# Patient Record
Sex: FEMALE | URBAN - METROPOLITAN AREA
[De-identification: names, ages, dates, MRNs, and addresses within clinical notes are randomized per-mention and may not be internally consistent; named-entity substitution may affect disease eponyms.]

---

## 2024-10-20 ENCOUNTER — INPATIENT (INPATIENT)
Facility: HOSPITAL | Age: 74
LOS: 1 days | Discharge: HOME | DRG: 379 | End: 2024-10-22
Attending: HOSPITALIST
Payer: MEDICARE

## 2024-10-20 VITALS
SYSTOLIC BLOOD PRESSURE: 88 MMHG | RESPIRATION RATE: 17 BRPM | DIASTOLIC BLOOD PRESSURE: 52 MMHG | TEMPERATURE: 98 F | HEART RATE: 59 BPM | WEIGHT: 154.98 LBS | OXYGEN SATURATION: 98 %

## 2024-10-20 DIAGNOSIS — I26.99 OTHER PULMONARY EMBOLISM WITHOUT ACUTE COR PULMONALE: ICD-10-CM

## 2024-10-20 DIAGNOSIS — K92.2 GASTROINTESTINAL HEMORRHAGE, UNSPECIFIED: ICD-10-CM

## 2024-10-20 LAB
ABO RH CONFIRMATION: SIGNIFICANT CHANGE UP
ALBUMIN SERPL ELPH-MCNC: 3.2 G/DL — LOW (ref 3.5–5.2)
ALP SERPL-CCNC: 45 U/L — SIGNIFICANT CHANGE UP (ref 30–115)
ALT FLD-CCNC: 22 U/L — SIGNIFICANT CHANGE UP (ref 0–41)
ANION GAP SERPL CALC-SCNC: 13 MMOL/L — SIGNIFICANT CHANGE UP (ref 7–14)
APPEARANCE UR: CLEAR — SIGNIFICANT CHANGE UP
APTT BLD: 23.4 SEC — CRITICAL LOW (ref 27–39.2)
AST SERPL-CCNC: 17 U/L — SIGNIFICANT CHANGE UP (ref 0–41)
BASOPHILS # BLD AUTO: 0.07 K/UL — SIGNIFICANT CHANGE UP (ref 0–0.2)
BASOPHILS NFR BLD AUTO: 0.7 % — SIGNIFICANT CHANGE UP (ref 0–1)
BILIRUB SERPL-MCNC: 0.2 MG/DL — SIGNIFICANT CHANGE UP (ref 0.2–1.2)
BILIRUB UR-MCNC: NEGATIVE — SIGNIFICANT CHANGE UP
BUN SERPL-MCNC: 33 MG/DL — HIGH (ref 10–20)
CALCIUM SERPL-MCNC: 8 MG/DL — LOW (ref 8.4–10.5)
CHLORIDE SERPL-SCNC: 100 MMOL/L — SIGNIFICANT CHANGE UP (ref 98–110)
CO2 SERPL-SCNC: 24 MMOL/L — SIGNIFICANT CHANGE UP (ref 17–32)
COLOR SPEC: YELLOW — SIGNIFICANT CHANGE UP
CREAT SERPL-MCNC: 0.9 MG/DL — SIGNIFICANT CHANGE UP (ref 0.7–1.5)
DIFF PNL FLD: ABNORMAL
EGFR: 66 ML/MIN/1.73M2 — SIGNIFICANT CHANGE UP
EOSINOPHIL # BLD AUTO: 0.12 K/UL — SIGNIFICANT CHANGE UP (ref 0–0.7)
EOSINOPHIL NFR BLD AUTO: 1.2 % — SIGNIFICANT CHANGE UP (ref 0–8)
GLUCOSE SERPL-MCNC: 144 MG/DL — HIGH (ref 70–99)
GLUCOSE UR QL: NEGATIVE MG/DL — SIGNIFICANT CHANGE UP
HCT VFR BLD CALC: 27.3 % — LOW (ref 37–47)
HGB BLD-MCNC: 9 G/DL — LOW (ref 12–16)
IMM GRANULOCYTES NFR BLD AUTO: 1.6 % — HIGH (ref 0.1–0.3)
INR BLD: 1.05 RATIO — SIGNIFICANT CHANGE UP (ref 0.65–1.3)
KETONES UR-MCNC: NEGATIVE MG/DL — SIGNIFICANT CHANGE UP
LEUKOCYTE ESTERASE UR-ACNC: NEGATIVE — SIGNIFICANT CHANGE UP
LYMPHOCYTES # BLD AUTO: 2.4 K/UL — SIGNIFICANT CHANGE UP (ref 1.2–3.4)
LYMPHOCYTES # BLD AUTO: 23.1 % — SIGNIFICANT CHANGE UP (ref 20.5–51.1)
MCHC RBC-ENTMCNC: 30.1 PG — SIGNIFICANT CHANGE UP (ref 27–31)
MCHC RBC-ENTMCNC: 33 G/DL — SIGNIFICANT CHANGE UP (ref 32–37)
MCV RBC AUTO: 91.3 FL — SIGNIFICANT CHANGE UP (ref 81–99)
MONOCYTES # BLD AUTO: 0.81 K/UL — HIGH (ref 0.1–0.6)
MONOCYTES NFR BLD AUTO: 7.8 % — SIGNIFICANT CHANGE UP (ref 1.7–9.3)
NEUTROPHILS # BLD AUTO: 6.8 K/UL — HIGH (ref 1.4–6.5)
NEUTROPHILS NFR BLD AUTO: 65.6 % — SIGNIFICANT CHANGE UP (ref 42.2–75.2)
NITRITE UR-MCNC: NEGATIVE — SIGNIFICANT CHANGE UP
NRBC # BLD: 0 /100 WBCS — SIGNIFICANT CHANGE UP (ref 0–0)
PH UR: 6 — SIGNIFICANT CHANGE UP (ref 5–8)
PLATELET # BLD AUTO: 223 K/UL — SIGNIFICANT CHANGE UP (ref 130–400)
PMV BLD: 9.6 FL — SIGNIFICANT CHANGE UP (ref 7.4–10.4)
POTASSIUM SERPL-MCNC: 4 MMOL/L — SIGNIFICANT CHANGE UP (ref 3.5–5)
POTASSIUM SERPL-SCNC: 4 MMOL/L — SIGNIFICANT CHANGE UP (ref 3.5–5)
PROT SERPL-MCNC: 4.7 G/DL — LOW (ref 6–8)
PROT UR-MCNC: NEGATIVE MG/DL — SIGNIFICANT CHANGE UP
PROTHROM AB SERPL-ACNC: 12 SEC — SIGNIFICANT CHANGE UP (ref 9.95–12.87)
RBC # BLD: 2.99 M/UL — LOW (ref 4.2–5.4)
RBC # FLD: 13.8 % — SIGNIFICANT CHANGE UP (ref 11.5–14.5)
SODIUM SERPL-SCNC: 137 MMOL/L — SIGNIFICANT CHANGE UP (ref 135–146)
SP GR SPEC: 1.01 — SIGNIFICANT CHANGE UP (ref 1–1.03)
UROBILINOGEN FLD QL: 0.2 MG/DL — SIGNIFICANT CHANGE UP (ref 0.2–1)
WBC # BLD: 10.37 K/UL — SIGNIFICANT CHANGE UP (ref 4.8–10.8)
WBC # FLD AUTO: 10.37 K/UL — SIGNIFICANT CHANGE UP (ref 4.8–10.8)

## 2024-10-20 PROCEDURE — 88312 SPECIAL STAINS GROUP 1: CPT

## 2024-10-20 PROCEDURE — 88305 TISSUE EXAM BY PATHOLOGIST: CPT

## 2024-10-20 PROCEDURE — 85027 COMPLETE CBC AUTOMATED: CPT

## 2024-10-20 PROCEDURE — 97162 PT EVAL MOD COMPLEX 30 MIN: CPT | Mod: GP

## 2024-10-20 PROCEDURE — P9016: CPT

## 2024-10-20 PROCEDURE — 80053 COMPREHEN METABOLIC PANEL: CPT

## 2024-10-20 PROCEDURE — 36430 TRANSFUSION BLD/BLD COMPNT: CPT

## 2024-10-20 PROCEDURE — 36415 COLL VENOUS BLD VENIPUNCTURE: CPT

## 2024-10-20 PROCEDURE — 85025 COMPLETE CBC W/AUTO DIFF WBC: CPT

## 2024-10-20 PROCEDURE — 93010 ELECTROCARDIOGRAM REPORT: CPT

## 2024-10-20 PROCEDURE — 99285 EMERGENCY DEPT VISIT HI MDM: CPT

## 2024-10-20 PROCEDURE — 83735 ASSAY OF MAGNESIUM: CPT

## 2024-10-20 PROCEDURE — 81001 URINALYSIS AUTO W/SCOPE: CPT

## 2024-10-20 RX ORDER — ATENOLOL 100 MG
50 TABLET ORAL DAILY
Refills: 0 | Status: DISCONTINUED | OUTPATIENT
Start: 2024-10-20 | End: 2024-10-20

## 2024-10-20 RX ORDER — CHLORHEXIDINE GLUCONATE 40 MG/ML
1 SOLUTION TOPICAL DAILY
Refills: 0 | Status: DISCONTINUED | OUTPATIENT
Start: 2024-10-20 | End: 2024-10-22

## 2024-10-20 RX ORDER — ACETAMINOPHEN 500 MG
650 TABLET ORAL EVERY 6 HOURS
Refills: 0 | Status: DISCONTINUED | OUTPATIENT
Start: 2024-10-20 | End: 2024-10-22

## 2024-10-20 RX ORDER — AMLODIPINE BESYLATE 10 MG
5 TABLET ORAL DAILY
Refills: 0 | Status: DISCONTINUED | OUTPATIENT
Start: 2024-10-21 | End: 2024-10-22

## 2024-10-20 RX ORDER — PANTOPRAZOLE SODIUM 40 MG/1
8 TABLET, DELAYED RELEASE ORAL
Qty: 80 | Refills: 0 | Status: DISCONTINUED | OUTPATIENT
Start: 2024-10-20 | End: 2024-10-22

## 2024-10-20 RX ORDER — MELATONIN 5 MG
3 TABLET ORAL AT BEDTIME
Refills: 0 | Status: DISCONTINUED | OUTPATIENT
Start: 2024-10-20 | End: 2024-10-22

## 2024-10-20 RX ORDER — ONDANSETRON HYDROCHLORIDE 2 MG/ML
4 INJECTION, SOLUTION INTRAMUSCULAR; INTRAVENOUS EVERY 8 HOURS
Refills: 0 | Status: DISCONTINUED | OUTPATIENT
Start: 2024-10-20 | End: 2024-10-22

## 2024-10-20 RX ORDER — DIETHYLTOLUAMIDE 7 %
1 SPRAY, NON-AEROSOL (ML) TOPICAL DAILY
Refills: 0 | Status: DISCONTINUED | OUTPATIENT
Start: 2024-10-20 | End: 2024-10-22

## 2024-10-20 RX ORDER — SODIUM CHLORIDE 9 MG/ML
1000 INJECTION, SOLUTION INTRAMUSCULAR; INTRAVENOUS; SUBCUTANEOUS ONCE
Refills: 0 | Status: COMPLETED | OUTPATIENT
Start: 2024-10-20 | End: 2024-10-20

## 2024-10-20 RX ORDER — PANTOPRAZOLE SODIUM 40 MG/1
40 TABLET, DELAYED RELEASE ORAL ONCE
Refills: 0 | Status: COMPLETED | OUTPATIENT
Start: 2024-10-20 | End: 2024-10-20

## 2024-10-20 RX ORDER — MAGNESIUM, ALUMINUM HYDROXIDE 200-200 MG
30 TABLET,CHEWABLE ORAL EVERY 4 HOURS
Refills: 0 | Status: DISCONTINUED | OUTPATIENT
Start: 2024-10-20 | End: 2024-10-22

## 2024-10-20 RX ADMIN — PANTOPRAZOLE SODIUM 40 MILLIGRAM(S): 40 TABLET, DELAYED RELEASE ORAL at 12:50

## 2024-10-20 RX ADMIN — SODIUM CHLORIDE 1000 MILLILITER(S): 9 INJECTION, SOLUTION INTRAMUSCULAR; INTRAVENOUS; SUBCUTANEOUS at 12:50

## 2024-10-20 RX ADMIN — PANTOPRAZOLE SODIUM 10 MG/HR: 40 TABLET, DELAYED RELEASE ORAL at 12:51

## 2024-10-20 RX ADMIN — Medication 20 MILLIGRAM(S): at 21:10

## 2024-10-20 NOTE — PATIENT PROFILE ADULT - FUNCTIONAL ASSESSMENT - BASIC MOBILITY 4.
Patient is in the supine position.   The body was positioned using the following devices: safety strap.  The head was positioned using the following devices: regular pillow.  The left arm was positioned using the following devices: arm board.  The right arm was positioned using the following devices: arm board.  The left leg was positioned using the following devices: safety strap.  The right leg was positioned using the following devices: safety strap.   3 = A little assistance

## 2024-10-20 NOTE — ED ADULT NURSE NOTE - EXTENSIONS OF SELF_ADULT
Impression: Headache, unspecified: R51.9. Plan: See FMD as scheduled. RTC 1 year CEE or ASAP should they experience a decrease in vision, pain, or any worsening of condition.
Impression: Myopia, bilateral: H52.13. Plan: Defers RX today.
None

## 2024-10-20 NOTE — H&P ADULT - NS ATTEND AMEND GEN_ALL_CORE FT
Agree with the plan above. Patient in no acute distress.   GI follow up. NPO with ice chips for now. Trend CBC. Transfuse if Hb below 8 or hemodynamically unstable.   Maintain 2 large bore IV lines. IV fluids. Contact GI promptly if active GI bleed is noted.   Plan discussed with the daughter at bedside.

## 2024-10-20 NOTE — H&P ADULT - NSHPLABSRESULTS_GEN_ALL_CORE
9.0    10.37 )-----------( 223      ( 20 Oct 2024 12:15 )             27.3       10    137  |  100  |  33[H]  ----------------------------<  144[H]  4.0   |  24  |  0.9    Ca    8.0[L]      20 Oct 2024 12:15    TPro  4.7[L]  /  Alb  3.2[L]  /  TBili  0.2  /  DBili  x   /  AST  17  /  ALT  22  /  AlkPhos  45  10-20                  Urinalysis Basic - ( 20 Oct 2024 13:52 )    Color: Yellow / Appearance: Clear / S.012 / pH: x  Gluc: x / Ketone: Negative mg/dL  / Bili: Negative / Urobili: 0.2 mg/dL   Blood: x / Protein: Negative mg/dL / Nitrite: Negative   Leuk Esterase: Negative / RBC: 5 /HPF / WBC 1 /HPF   Sq Epi: x / Non Sq Epi: 4 /HPF / Bacteria: Occasional /HPF        PT/INR - ( 20 Oct 2024 12:45 )   PT: 12.00 sec;   INR: 1.05 ratio         PTT - ( 20 Oct 2024 12:45 )  PTT:23.4 sec    Lactate Trend

## 2024-10-20 NOTE — ED PROVIDER NOTE - PHYSICAL EXAMINATION
CONST: NAD  EYES: PERRL, EOMI, Sclera and conjunctiva pale.   ENT: No nasal discharge. Oropharynx normal appearing  NECK: Non-tender, no meningeal signs. normal ROM. supple   CARD: S1 S2; No jvd  RESP: Equal BS B/L, No wheezes, rhonchi or rales. No distress  GI: Soft, non-tender, non-distended. no cva tenderness. normal BS  Rectal: external hemorrhoids. IRMA mix of tarry stools with dark red blood  MS: Normal ROM in all extremities. pulses 2 +. no calf tenderness or swelling  SKIN: Warm, dry, no acute rashes. Good turgor  NEURO: A&Ox3, No focal deficits. Strength 5/5 with no sensory deficits.

## 2024-10-20 NOTE — ED PROVIDER NOTE - CLINICAL SUMMARY MEDICAL DECISION MAKING FREE TEXT BOX
Presents with GI bleed. blood pressure low on presentation responding to fluids. Pt reluctant for blood transfusion. GI consult called. ICU consulted. Advised floor admission. Blood transfusion per GI.

## 2024-10-20 NOTE — H&P ADULT - ASSESSMENT
74y old female with pmhx of HTN, HLD presents to the ED as per family complaining of sudden onset of bloody stools noticed while having BM yesterday. pt states reoccurence of dark blood mixed with dark tarry stools today. pt was seen by PMD and told to see GI. As per family pt became weak and lightheaded today but denies chest/abdominal pain, SOB, headache. n/v, fever/chills, LUTS or LOC. Colonoscopy about 8yrs ago-wnl.    #GI Bleed  - admit to med-surg  - tele monitor  - labs/ecg/c-xray  - t&s  - serial h/h  - transfuse prn  - npo  - PPI  - hold aspirin  - GI consult  - DVT prohylaxis with SCD    #?ELIZABETH  - iv hydration  - avoid nephrotoxic meds  - monitor electrolytes    #Hyperglycemia  glucose-144  - f.s  - monitor blood sugar    #HTN  hypotensive now  - hold home antihypertensive meds for now  - iv hydration  - vss q6  - monitor vss    #HLD  - continue home lipitor  - monitor pt 74y old female with pmhx of HTN, HLD presents to the ED as per family complaining of sudden onset of bloody stools noticed while having BM yesterday. pt states reoccurence of dark blood mixed with dark tarry stools today. pt was seen by PMD and told to see GI. As per family pt became weak and lightheaded today but denies chest/abdominal pain, SOB, headache. n/v, fever/chills, LUTS or LOC. Colonoscopy about 8yrs ago-wnl.    #GI Bleed  - admit to med-surg  - tele monitor  - labs/ecg/c-xray  - t&s  - serial h/h  - transfuse prn  - npo  - PPI  - hold aspirin  - GI consult  - DVT prohylaxis with SCD    #?ELIZABETH  - iv hydration  - avoid nephrotoxic meds  - monitor electrolytes    #Hyperglycemia  glucose-144  - f.s  - monitor blood sugar    #HTN  hypotensive now  - hold home antihypertensive meds for now  - iv hydration  - vss q6  - monitor vss    #HLD  - continue home lipitor  - monitor pt    #DVT PPX-[employer] for now.

## 2024-10-20 NOTE — PATIENT PROFILE ADULT - FALL HARM RISK - HARM RISK INTERVENTIONS
Assistance with ambulation/Assistance OOB with selected safe patient handling equipment/Communicate Risk of Fall with Harm to all staff/Monitor gait and stability/Reinforce activity limits and safety measures with patient and family/Sit up slowly, dangle for a short time, stand at bedside before walking/Tailored Fall Risk Interventions/Visual Cue: Yellow wristband and red socks/Bed in lowest position, wheels locked, appropriate side rails in place/Call bell, personal items and telephone in reach/Instruct patient to call for assistance before getting out of bed or chair/Non-slip footwear when patient is out of bed/[place] to call system/Physically safe environment - no spills, clutter or unnecessary equipment/Purposeful Proactive Rounding/Room/bathroom lighting operational, light cord in reach

## 2024-10-20 NOTE — ED PROVIDER NOTE - ATTENDING APP SHARED VISIT CONTRIBUTION OF CARE
Pt reports yesterday she had 5 BM with dark blood and two bowel movements this am. No abdominal pain. Pt reports feeling weak. No previous bleeding. last colonoscopy was 8 years ago normal. Recently did stool screening with stool sample. Hx of HTN. Takes baby aspirin two times a week. On exam blood pressure low on arrival. S1S2 rrr, lungs clear, no abdominal tenderness, no back tenderness. AAO3. With [employer] who translated.

## 2024-10-20 NOTE — H&P ADULT - HISTORY OF PRESENT ILLNESS
74y old female with pmhx of HTN, HLD presents to the ED as per family complaining of sudden onset of bloody stools noticed while having BM yesterday. pt states reoccurence of dark blood mixed with dark tarry stools today. pt was seen by PMD and told to see GI. As per family pt became weak and lightheaded today but denies chest/abdominal pain, SOB, headache. n/v, fever/chills, LUTS or LOC. Colonoscopy about 8yrs ago-wnl.

## 2024-10-20 NOTE — H&P ADULT - NSHPPHYSICALEXAM_GEN_ALL_CORE
Vital Signs Last 24 Hrs  T(C): 37.2 (20 Oct 2024 15:02), Max: 37.2 (20 Oct 2024 15:02)  T(F): 98.9 (20 Oct 2024 15:02), Max: 98.9 (20 Oct 2024 15:02)  HR: 67 (20 Oct 2024 15:02) (55 - 67)  BP: 100/62 (20 Oct 2024 15:02) (88/52 - 122/71)  RR: 18 (20 Oct 2024 15:02) (16 - 18)  SpO2: 98% (20 Oct 2024 15:02) (97% - 98%)  Parameters below as of 20 Oct 2024 12:44  Patient On (Oxygen Delivery Method): room air    PHYSICAL EXAM:  GENERAL: alert, comfortable, frail, NAD  HEAD:  Atraumatic, Normocephalic  EYES: EOMI, PERRLA, conjunctiva and sclera clear  NECK: Supple, No JVD  CHEST/LUNG: Clear to auscultation bilaterally; No wheeze  HEART: Regular rate and rhythm; s1,s2  ABDOMEN: Soft, Nontender, Nondistended; Bowel sounds present  Rectal: +hemorrhoids. +tarry stools/dark red blood  EXTREMITIES:  2+ Peripheral Pulses, No clubbing, cyanosis, or edema  NEUROLOGY: non-focal  SKIN: No rashes or lesions

## 2024-10-20 NOTE — H&P ADULT - TIME BILLING
On this date of service, level of risk to patient is considered: Moderate.     I have personally seen and examined this patient.    I have reviewed all pertinent clinical information and reviewed all relevant imaging and diagnostic studies personally.

## 2024-10-20 NOTE — ED PROVIDER NOTE - OBJECTIVE STATEMENT
44-year-old female past medical history of hypertension, hyperlipidemia presents for evaluation of bloody stools.  Patient states when having a bowel movement yesterday noted a mix of bright red blood and dark tarry stools but did not think much of it until today when she had a similar episode leading her to go to her PMD who instructed her to follow-up with GI.  Since leaving her PMD has developed generalized fatigue and lightheadedness.  Denies fever, headache, chest pain, shortness of breath, abdominal pain, dysuria, hematuria, vomiting, diarrhea, constipation.

## 2024-10-20 NOTE — ED ADULT NURSE NOTE - NSFALLUNIVINTERV_ED_ALL_ED
Bed/Stretcher in lowest position, wheels locked, appropriate side rails in place/Call bell, personal items and telephone in reach/Instruct patient to call for assistance before getting out of bed/chair/stretcher/Non-slip footwear applied when patient is off stretcher/[place] to call system/Physically safe environment - no spills, clutter or unnecessary equipment/Purposeful proactive rounding/Room/bathroom lighting operational, light cord in reach

## 2024-10-20 NOTE — H&P ADULT - NSHPREVIEWOFSYSTEMS_GEN_ALL_CORE
GENERAL: No fever, no chills  HEENT:  no eye pain, visual change/discharge, no neck pain/stiffness  RESPIRATORY: No cough, wheezing, chills or hemoptysis; No Shortness of Breath  CARDIOVASCULAR: no chest pain, no palpitation  GASTROINTESTINAL: No abdominal  pain. No nausea, vomiting, diarrhea. (+)melena, (+)hematochezia.  GENITOURINARY: no LUTS  NEUROLOGICAL: Non focal  SKIN: No itching, burning, rashes, or lesions

## 2024-10-21 LAB
ALBUMIN SERPL ELPH-MCNC: 3.2 G/DL — LOW (ref 3.5–5.2)
ALP SERPL-CCNC: 47 U/L — SIGNIFICANT CHANGE UP (ref 30–115)
ALT FLD-CCNC: 21 U/L — SIGNIFICANT CHANGE UP (ref 0–41)
ANION GAP SERPL CALC-SCNC: 10 MMOL/L — SIGNIFICANT CHANGE UP (ref 7–14)
AST SERPL-CCNC: 14 U/L — SIGNIFICANT CHANGE UP (ref 0–41)
BASOPHILS # BLD AUTO: 0.07 K/UL — SIGNIFICANT CHANGE UP (ref 0–0.2)
BASOPHILS NFR BLD AUTO: 0.7 % — SIGNIFICANT CHANGE UP (ref 0–1)
BILIRUB SERPL-MCNC: 0.6 MG/DL — SIGNIFICANT CHANGE UP (ref 0.2–1.2)
BUN SERPL-MCNC: 16 MG/DL — SIGNIFICANT CHANGE UP (ref 10–20)
CALCIUM SERPL-MCNC: 7.9 MG/DL — LOW (ref 8.4–10.5)
CHLORIDE SERPL-SCNC: 106 MMOL/L — SIGNIFICANT CHANGE UP (ref 98–110)
CO2 SERPL-SCNC: 27 MMOL/L — SIGNIFICANT CHANGE UP (ref 17–32)
CREAT SERPL-MCNC: 0.7 MG/DL — SIGNIFICANT CHANGE UP (ref 0.7–1.5)
EGFR: 90 ML/MIN/1.73M2 — SIGNIFICANT CHANGE UP
EOSINOPHIL # BLD AUTO: 0.22 K/UL — SIGNIFICANT CHANGE UP (ref 0–0.7)
EOSINOPHIL NFR BLD AUTO: 2.2 % — SIGNIFICANT CHANGE UP (ref 0–8)
GLUCOSE SERPL-MCNC: 96 MG/DL — SIGNIFICANT CHANGE UP (ref 70–99)
HCT VFR BLD CALC: 33.9 % — LOW (ref 37–47)
HCT VFR BLD CALC: 34 % — LOW (ref 37–47)
HGB BLD-MCNC: 11.3 G/DL — LOW (ref 12–16)
HGB BLD-MCNC: 11.5 G/DL — LOW (ref 12–16)
IMM GRANULOCYTES NFR BLD AUTO: 0.9 % — HIGH (ref 0.1–0.3)
LYMPHOCYTES # BLD AUTO: 2.55 K/UL — SIGNIFICANT CHANGE UP (ref 1.2–3.4)
LYMPHOCYTES # BLD AUTO: 25.9 % — SIGNIFICANT CHANGE UP (ref 20.5–51.1)
MCHC RBC-ENTMCNC: 29.8 PG — SIGNIFICANT CHANGE UP (ref 27–31)
MCHC RBC-ENTMCNC: 30.3 PG — SIGNIFICANT CHANGE UP (ref 27–31)
MCHC RBC-ENTMCNC: 33.3 G/DL — SIGNIFICANT CHANGE UP (ref 32–37)
MCHC RBC-ENTMCNC: 33.8 G/DL — SIGNIFICANT CHANGE UP (ref 32–37)
MCV RBC AUTO: 89.4 FL — SIGNIFICANT CHANGE UP (ref 81–99)
MCV RBC AUTO: 89.5 FL — SIGNIFICANT CHANGE UP (ref 81–99)
MONOCYTES # BLD AUTO: 0.9 K/UL — HIGH (ref 0.1–0.6)
MONOCYTES NFR BLD AUTO: 9.1 % — SIGNIFICANT CHANGE UP (ref 1.7–9.3)
NEUTROPHILS # BLD AUTO: 6.03 K/UL — SIGNIFICANT CHANGE UP (ref 1.4–6.5)
NEUTROPHILS NFR BLD AUTO: 61.2 % — SIGNIFICANT CHANGE UP (ref 42.2–75.2)
NRBC # BLD: 0 /100 WBCS — SIGNIFICANT CHANGE UP (ref 0–0)
NRBC # BLD: 0 /100 WBCS — SIGNIFICANT CHANGE UP (ref 0–0)
PLATELET # BLD AUTO: 188 K/UL — SIGNIFICANT CHANGE UP (ref 130–400)
PLATELET # BLD AUTO: 189 K/UL — SIGNIFICANT CHANGE UP (ref 130–400)
PMV BLD: 9.3 FL — SIGNIFICANT CHANGE UP (ref 7.4–10.4)
PMV BLD: 9.4 FL — SIGNIFICANT CHANGE UP (ref 7.4–10.4)
POTASSIUM SERPL-MCNC: 3.8 MMOL/L — SIGNIFICANT CHANGE UP (ref 3.5–5)
POTASSIUM SERPL-SCNC: 3.8 MMOL/L — SIGNIFICANT CHANGE UP (ref 3.5–5)
PROT SERPL-MCNC: 4.8 G/DL — LOW (ref 6–8)
RBC # BLD: 3.79 M/UL — LOW (ref 4.2–5.4)
RBC # BLD: 3.8 M/UL — LOW (ref 4.2–5.4)
RBC # FLD: 14.9 % — HIGH (ref 11.5–14.5)
RBC # FLD: 14.9 % — HIGH (ref 11.5–14.5)
SODIUM SERPL-SCNC: 143 MMOL/L — SIGNIFICANT CHANGE UP (ref 135–146)
WBC # BLD: 8.19 K/UL — SIGNIFICANT CHANGE UP (ref 4.8–10.8)
WBC # BLD: 9.86 K/UL — SIGNIFICANT CHANGE UP (ref 4.8–10.8)
WBC # FLD AUTO: 8.19 K/UL — SIGNIFICANT CHANGE UP (ref 4.8–10.8)
WBC # FLD AUTO: 9.86 K/UL — SIGNIFICANT CHANGE UP (ref 4.8–10.8)

## 2024-10-21 PROCEDURE — 99223 1ST HOSP IP/OBS HIGH 75: CPT

## 2024-10-21 PROCEDURE — 99232 SBSQ HOSP IP/OBS MODERATE 35: CPT

## 2024-10-21 RX ADMIN — Medication 20 MILLIGRAM(S): at 21:18

## 2024-10-21 RX ADMIN — Medication 5 MILLIGRAM(S): at 05:18

## 2024-10-21 RX ADMIN — CHLORHEXIDINE GLUCONATE 1 APPLICATION(S): 40 SOLUTION TOPICAL at 11:51

## 2024-10-21 RX ADMIN — Medication 1 TABLET(S): at 11:49

## 2024-10-21 NOTE — PROGRESS NOTE ADULT - ASSESSMENT
74y old female with pmhx of HTN, HLD presents to the ED as per family complaining of sudden onset of bloody stools noticed while having BM yesterday. pt states reoccurence of dark blood mixed with dark tarry stools today. Admitted to telemetry for GI bleed.    #Suspected UGIB  - + IRMA tarry stool mixed with dark red blood  - Hgb 9 on admission  - s/p 1 unit pRBC  - GI consulted  - NPO  - PPI drip  - Keep active T&S  - Trend H/H  - transfuse prn  - f/u GI recs regarding endoscopic intervention    #ELIZABETH  - likely pre-renal in setting of GI bleed and volume loss  - s/p IV hydration  - monitor BMP  - avoid nephrotoxic meds    #HTN  - on losartan and atenolol at home  - hold antihypertensives in setting of GI bleed  - monitor BP    #HLD  - c/w atorvastatin    Misc:  DVT ppx: SCDs  GI ppx: Protonix  Diet: NPO  Activity: IAT  Code: Full Code  Dispo: Acute

## 2024-10-21 NOTE — PROGRESS NOTE ADULT - SUBJECTIVE AND OBJECTIVE BOX
CLARE PORTER 74y Female  MRN#: [id number]     SUBJECTIVE  Patient is a 74y old Female who presents with a chief complaint of GI Bleed (20 Oct 2024 15:12)    Interval/Overnight Events:    Today is hospital day 1d, and this morning she is lying in bed without distress.   No acute overnight events.     OBJECTIVE  PAST MEDICAL & SURGICAL HISTORY  HTN (hypertension)    HLD (hyperlipidemia)      ALLERGIES:  No Known Allergies    MEDICATIONS:  STANDING MEDICATIONS  amLODIPine   Tablet 5 milliGRAM(s) Oral daily  atorvastatin 20 milliGRAM(s) Oral at bedtime  calcium carbonate 1250 mG  + Vitamin D (OsCal 500 + D) 1 Tablet(s) Oral daily  chlorhexidine 2% Cloths 1 Application(s) Topical daily  pantoprazole Infusion 8 mG/Hr IV Continuous <Continuous>    PRN MEDICATIONS  acetaminophen     Tablet .. 650 milliGRAM(s) Oral every 6 hours PRN  aluminum hydroxide/magnesium hydroxide/simethicone Suspension 30 milliLiter(s) Oral every 4 hours PRN  melatonin 3 milliGRAM(s) Oral at bedtime PRN  ondansetron Injectable 4 milliGRAM(s) IV Push every 8 hours PRN    HOME MEDICATIONS  Home Medications:  amLODIPine 5 mg oral tablet: 1 tab(s) orally once a day (20 Oct 2024 15:42)  atenolol 50 mg oral tablet: 1 tab(s) orally once a day (20 Oct 2024 15:42)  calcium (as carbonate)-vitamin D 600 mg-5 mcg (200 intl units) oral tablet: 1 tab(s) orally once a day (20 Oct 2024 15:42)  ergocalciferol 1.25 mg (50,000 intl units) oral tablet: orally once a day (20 Oct 2024 15:42)  halobetasol 0.05% topical cream: Apply topically to affected area once a day as needed for  itching (20 Oct 2024 15:42)  Lipitor 20 mg oral tablet: 1 tab(s) orally once a day (20 Oct 2024 15:42)  losartan 100 mg oral tablet: 1 tab(s) orally once a day (20 Oct 2024 15:42)  Mintox Plus 200 mg-200 mg-25 mg oral tablet, chewable: 1 tab(s) chewed once a day (20 Oct 2024 15:42)  vitamin b complex:  (20 Oct 2024 15:42)      LABS:                        11.3   9.86  )-----------( 189      ( 21 Oct 2024 06:00 )             33.9     10-21    143  |  106  |  16  ----------------------------<  96  3.8   |  27  |  0.7    Ca    7.9[L]      21 Oct 2024 06:00    TPro  4.8[L]  /  Alb  3.2[L]  /  TBili  0.6  /  DBili  x   /  AST  14  /  ALT  21  /  AlkPhos  47  10-21    LIVER FUNCTIONS - ( 21 Oct 2024 06:00 )  Alb: 3.2 g/dL / Pro: 4.8 g/dL / ALK PHOS: 47 U/L / ALT: 21 U/L / AST: 14 U/L / GGT: x           PT/INR - ( 20 Oct 2024 12:45 )   PT: 12.00 sec;   INR: 1.05 ratio         PTT - ( 20 Oct 2024 12:45 )  PTT:23.4 sec  Urinalysis Basic - ( 21 Oct 2024 06:00 )    Color: x / Appearance: x / SG: x / pH: x  Gluc: 96 mg/dL / Ketone: x  / Bili: x / Urobili: x   Blood: x / Protein: x / Nitrite: x   Leuk Esterase: x / RBC: x / WBC x   Sq Epi: x / Non Sq Epi: x / Bacteria: x            Urinalysis with Rflx Culture (collected 20 Oct 2024 13:52)          CAPILLARY BLOOD GLUCOSE          PHYSICAL EXAM:  T(C): 36.6 (10-21-24 @ 04:40), Max: 37.2 (10-20-24 @ 15:02)  HR: 58 (10-21-24 @ 04:40) (54 - 67)  BP: 116/73 (10-21-24 @ 04:40) (88/52 - 122/71)  RR: 18 (10-21-24 @ 04:40) (16 - 18)  SpO2: 97% (10-21-24 @ 04:40) (96% - 98%)    GENERAL: alert, comfortable, frail, NAD  CHEST/LUNG: Clear to auscultation bilaterally  HEART: Regular rate and rhythm; s1,s2  ABDOMEN: Soft, Nontender, Nondistended; Bowel sounds present  NEUROLOGY: non-focal  SKIN: No rashes or lesions    ADMISSION SUMMARY  Patient is a 74y old Female who presents with a chief complaint of GI Bleed (20 Oct 2024 15:12)

## 2024-10-21 NOTE — PHYSICAL THERAPY INITIAL EVALUATION ADULT - ADDITIONAL COMMENTS
Pt lives in a private home with her , daughter, and nephews. There are 13 KATE with railing on the L, 25 stairs with B rails, 13 steps with railing on the L. She has HHA 4 hours, 5 days a week. Pt states she does not drive.

## 2024-10-21 NOTE — PHYSICAL THERAPY INITIAL EVALUATION ADULT - GENERAL OBSERVATIONS, REHAB EVAL
PT 8:50 - 9:10. Pt was seen for PT IE. chart thoroughly reviewed. RN Silvano was aware. Pt agreed to be seen. Pt was found sitting in a chair at bedside in no apparent distress. +IV +telemonitoring Family at bedside

## 2024-10-21 NOTE — CONSULT NOTE ADULT - SUBJECTIVE AND OBJECTIVE BOX
Chief complaint/Reason for consult: GI bleed    HPI:  74y old female with pmhx of HTN, HLD presents to the ED as per family complaining of sudden onset of bloody stools noticed while having BM yesterday. pt states reoccurence of dark blood mixed with dark tarry stools today. pt was seen by PMD and told to see GI. As per family pt became weak and lightheaded today but denies chest/abdominal pain, SOB, headache. n/v, fever/chills, LUTS or LOC. Colonoscopy about 8yrs ago-wnl.       (20 Oct 2024 15:12)    GI updates: 74yFemale pmh HTN, HLD presents for sudden onset bloody stools. Patient reports dark tarry stools that started yesterday. Patient denies N-saids, weight loss reports normal Colonoscopy 8 years ago. pAtient has never had an EGD before.      PAST MEDICAL & SURGICAL HISTORY:   HTN (hypertension)      HLD (hyperlipidemia)            Family history:  FAMILY HISTORY:    No GI cancers in first or second degree relatives    Social History: No smoking. No alcohol. No illegal drug use.    Allergies:   No Known Allergies  Intolerances            MEDICATIONS: Home Medications:  amLODIPine 5 mg oral tablet: 1 tab(s) orally once a day (20 Oct 2024 15:42)  atenolol 50 mg oral tablet: 1 tab(s) orally once a day (20 Oct 2024 15:42)  calcium (as carbonate)-vitamin D 600 mg-5 mcg (200 intl units) oral tablet: 1 tab(s) orally once a day (20 Oct 2024 15:42)  ergocalciferol 1.25 mg (50,000 intl units) oral tablet: orally once a day (20 Oct 2024 15:42)  halobetasol 0.05% topical cream: Apply topically to affected area once a day as needed for  itching (20 Oct 2024 15:42)  Lipitor 20 mg oral tablet: 1 tab(s) orally once a day (20 Oct 2024 15:42)  losartan 100 mg oral tablet: 1 tab(s) orally once a day (20 Oct 2024 15:42)  Mintox Plus 200 mg-200 mg-25 mg oral tablet, chewable: 1 tab(s) chewed once a day (20 Oct 2024 15:42)  vitamin b complex:  (20 Oct 2024 15:42)    MEDICATIONS  (STANDING):  amLODIPine   Tablet 5 milliGRAM(s) Oral daily  atorvastatin 20 milliGRAM(s) Oral at bedtime  calcium carbonate 1250 mG  + Vitamin D (OsCal 500 + D) 1 Tablet(s) Oral daily  chlorhexidine 2% Cloths 1 Application(s) Topical daily  pantoprazole Infusion 8 mG/Hr (10 mL/Hr) IV Continuous <Continuous>    MEDICATIONS  (PRN):  acetaminophen     Tablet .. 650 milliGRAM(s) Oral every 6 hours PRN Temp greater or equal to 38C (100.4F), Mild Pain (1 - 3)  aluminum hydroxide/magnesium hydroxide/simethicone Suspension 30 milliLiter(s) Oral every 4 hours PRN Dyspepsia  melatonin 3 milliGRAM(s) Oral at bedtime PRN Insomnia  ondansetron Injectable 4 milliGRAM(s) IV Push every 8 hours PRN Nausea and/or Vomiting        REVIEW OF SYSTEMS  General:  No weight loss, fevers, or chills.  Eyes:  No reported pain or visual changes  ENT:  No sore throat or runny nose.  NECK: No stiffness or lymphadenopathy  CV:  No chest pain or palpitations.  Resp:  No shortness of breath, cough, wheezing or hemoptysis  GI:  No abdominal pain, nausea, vomiting, dysphagia, diarrhea or constipation. No rectal bleeding, melena, or hematemesis.  Muscle:  No aches or weakness  Neuro:  No tingling, numbness       VITALS:   T(F): 97.8 (10-21-24 @ 04:40), Max: 98.9 (10-20-24 @ 15:02)  HR: 58 (10-21-24 @ 04:40) (54 - 67)  BP: 116/73 (10-21-24 @ 04:40) (88/52 - 122/71)  RR: 18 (10-21-24 @ 04:40) (16 - 18)  SpO2: 97% (10-21-24 @ 04:40) (96% - 98%)    PHYSICAL EXAM:  GENERAL: AAOx3, no acute distress.  HEAD:  Atraumatic, Normocephalic  EYES: conjunctiva and sclera clear  NECK: Supple, No thyromegaly   CHEST/LUNG: Clear to auscultation bilaterally; No wheeze, rhonchi, or rales  HEART: Regular rate and rhythm; normal S1, S2, No murmurs.  ABDOMEN: Soft, nontender, nondistended; Bowel sounds present  NEUROLOGY: No asterixis or tremor  SKIN: Intact, no jaundice          LABS:  10-21    143  |  106  |  16  ----------------------------<  96  3.8   |  27  |  0.7    Ca    7.9[L]      21 Oct 2024 06:00    TPro  4.8[L]  /  Alb  3.2[L]  /  TBili  0.6  /  DBili  x   /  AST  14  /  ALT  21  /  AlkPhos  47  10-21                          11.3   9.86  )-----------( 189      ( 21 Oct 2024 06:00 )             33.9     LIVER FUNCTIONS - ( 21 Oct 2024 06:00 )  Alb: 3.2 g/dL / Pro: 4.8 g/dL / ALK PHOS: 47 U/L / ALT: 21 U/L / AST: 14 U/L / GGT: x           PT/INR - ( 20 Oct 2024 12:45 )   PT: 12.00 sec;   INR: 1.05 ratio         PTT - ( 20 Oct 2024 12:45 )  PTT:23.4 sec    IMAGING:         Chief complaint/Reason for consult: GI bleed    HPI:  74y old female with pmhx of HTN, HLD presents to the ED as per family complaining of sudden onset of bloody stools noticed while having BM yesterday. pt states reoccurence of dark blood mixed with dark tarry stools today. pt was seen by PMD and told to see GI. As per family pt became weak and lightheaded today but denies chest/abdominal pain, SOB, headache. n/v, fever/chills, LUTS or LOC. Colonoscopy about 8yrs ago-wnl.       (20 Oct 2024 15:12)    GI updates: 74yFemale pmh HTN, HLD presents for sudden onset bloody stools. Patient reports dark tarry stools that started yesterday. Patient denies N-saids, weight loss reports normal Colonoscopy 8 years ago. pAtient has never had an EGD before.      PAST MEDICAL & SURGICAL HISTORY:   HTN (hypertension)      HLD (hyperlipidemia)            Family history:  FAMILY HISTORY:    No GI cancers in first or second degree relatives    Social History: No smoking. No alcohol. No illegal drug use.    Allergies:   No Known Allergies  Intolerances            MEDICATIONS: Home Medications:  amLODIPine 5 mg oral tablet: 1 tab(s) orally once a day (20 Oct 2024 15:42)  atenolol 50 mg oral tablet: 1 tab(s) orally once a day (20 Oct 2024 15:42)  calcium (as carbonate)-vitamin D 600 mg-5 mcg (200 intl units) oral tablet: 1 tab(s) orally once a day (20 Oct 2024 15:42)  ergocalciferol 1.25 mg (50,000 intl units) oral tablet: orally once a day (20 Oct 2024 15:42)  halobetasol 0.05% topical cream: Apply topically to affected area once a day as needed for  itching (20 Oct 2024 15:42)  Lipitor 20 mg oral tablet: 1 tab(s) orally once a day (20 Oct 2024 15:42)  losartan 100 mg oral tablet: 1 tab(s) orally once a day (20 Oct 2024 15:42)  Mintox Plus 200 mg-200 mg-25 mg oral tablet, chewable: 1 tab(s) chewed once a day (20 Oct 2024 15:42)  vitamin b complex:  (20 Oct 2024 15:42)    MEDICATIONS  (STANDING):  amLODIPine   Tablet 5 milliGRAM(s) Oral daily  atorvastatin 20 milliGRAM(s) Oral at bedtime  calcium carbonate 1250 mG  + Vitamin D (OsCal 500 + D) 1 Tablet(s) Oral daily  chlorhexidine 2% Cloths 1 Application(s) Topical daily  pantoprazole Infusion 8 mG/Hr (10 mL/Hr) IV Continuous <Continuous>    MEDICATIONS  (PRN):  acetaminophen     Tablet .. 650 milliGRAM(s) Oral every 6 hours PRN Temp greater or equal to 38C (100.4F), Mild Pain (1 - 3)  aluminum hydroxide/magnesium hydroxide/simethicone Suspension 30 milliLiter(s) Oral every 4 hours PRN Dyspepsia  melatonin 3 milliGRAM(s) Oral at bedtime PRN Insomnia  ondansetron Injectable 4 milliGRAM(s) IV Push every 8 hours PRN Nausea and/or Vomiting        REVIEW OF SYSTEMS  General:  No weight loss, fevers, or chills.  Eyes:  No reported pain or visual changes  ENT:  No sore throat or runny nose.  NECK: No stiffness or lymphadenopathy  CV:  No chest pain or palpitations.  Resp:  No shortness of breath, cough, wheezing or hemoptysis  GI:  No abdominal pain, nausea, vomiting, dysphagia, diarrhea or constipation. No rectal bleeding, melena, or hematemesis.  Muscle:  No aches or weakness  Neuro:  No tingling, numbness       VITALS:   T(F): 97.8 (10-21-24 @ 04:40), Max: 98.9 (10-20-24 @ 15:02)  HR: 58 (10-21-24 @ 04:40) (54 - 67)  BP: 116/73 (10-21-24 @ 04:40) (88/52 - 122/71)  RR: 18 (10-21-24 @ 04:40) (16 - 18)  SpO2: 97% (10-21-24 @ 04:40) (96% - 98%)    PHYSICAL EXAM:  GENERAL: AAOx3, no acute distress.  HEAD:  Atraumatic, Normocephalic  EYES: conjunctiva and sclera clear  NECK: Supple, No thyromegaly   CHEST/LUNG: Clear to auscultation bilaterally; No wheeze, rhonchi, or rales  HEART: Regular rate and rhythm; normal S1, S2, No murmurs.  ABDOMEN: Soft, nontender, nondistended; Bowel sounds present  NEUROLOGY: No asterixis or tremor  SKIN: Intact, no jaundice  Rectal exam-melenic stool in rectal vault        LABS:  10-21    143  |  106  |  16  ----------------------------<  96  3.8   |  27  |  0.7    Ca    7.9[L]      21 Oct 2024 06:00    TPro  4.8[L]  /  Alb  3.2[L]  /  TBili  0.6  /  DBili  x   /  AST  14  /  ALT  21  /  AlkPhos  47  10-21                          11.3   9.86  )-----------( 189      ( 21 Oct 2024 06:00 )             33.9     LIVER FUNCTIONS - ( 21 Oct 2024 06:00 )  Alb: 3.2 g/dL / Pro: 4.8 g/dL / ALK PHOS: 47 U/L / ALT: 21 U/L / AST: 14 U/L / GGT: x           PT/INR - ( 20 Oct 2024 12:45 )   PT: 12.00 sec;   INR: 1.05 ratio         PTT - ( 20 Oct 2024 12:45 )  PTT:23.4 sec    IMAGING:         Chief complaint/Reason for consult: GI bleed    HPI:  74y old female with pmhx of HTN, HLD presents to the ED as per family complaining of sudden onset of bloody stools noticed while having BM yesterday. pt states reoccurence of dark blood mixed with dark tarry stools today. pt was seen by PMD and told to see GI. As per family pt became weak and lightheaded today but denies chest/abdominal pain, SOB, headache. n/v, fever/chills, LUTS or LOC. Colonoscopy about 8yrs ago-wnl.       (20 Oct 2024 15:12)  ID#[id number] used  GI updates: 74yFemale pmh HTN, HLD presents for sudden onset bloody stools. Patient reports dark tarry stools that started yesterday. Patient denies N-saids, weight loss reports normal Colonoscopy 8 years ago. pAtient has never had an EGD before.      PAST MEDICAL & SURGICAL HISTORY:   HTN (hypertension)      HLD (hyperlipidemia)            Family history:  FAMILY HISTORY:    No GI cancers in first or second degree relatives    Social History: No smoking. No alcohol. No illegal drug use.    Allergies:   No Known Allergies  Intolerances            MEDICATIONS: Home Medications:  amLODIPine 5 mg oral tablet: 1 tab(s) orally once a day (20 Oct 2024 15:42)  atenolol 50 mg oral tablet: 1 tab(s) orally once a day (20 Oct 2024 15:42)  calcium (as carbonate)-vitamin D 600 mg-5 mcg (200 intl units) oral tablet: 1 tab(s) orally once a day (20 Oct 2024 15:42)  ergocalciferol 1.25 mg (50,000 intl units) oral tablet: orally once a day (20 Oct 2024 15:42)  halobetasol 0.05% topical cream: Apply topically to affected area once a day as needed for  itching (20 Oct 2024 15:42)  Lipitor 20 mg oral tablet: 1 tab(s) orally once a day (20 Oct 2024 15:42)  losartan 100 mg oral tablet: 1 tab(s) orally once a day (20 Oct 2024 15:42)  Mintox Plus 200 mg-200 mg-25 mg oral tablet, chewable: 1 tab(s) chewed once a day (20 Oct 2024 15:42)  vitamin b complex:  (20 Oct 2024 15:42)    MEDICATIONS  (STANDING):  amLODIPine   Tablet 5 milliGRAM(s) Oral daily  atorvastatin 20 milliGRAM(s) Oral at bedtime  calcium carbonate 1250 mG  + Vitamin D (OsCal 500 + D) 1 Tablet(s) Oral daily  chlorhexidine 2% Cloths 1 Application(s) Topical daily  pantoprazole Infusion 8 mG/Hr (10 mL/Hr) IV Continuous <Continuous>    MEDICATIONS  (PRN):  acetaminophen     Tablet .. 650 milliGRAM(s) Oral every 6 hours PRN Temp greater or equal to 38C (100.4F), Mild Pain (1 - 3)  aluminum hydroxide/magnesium hydroxide/simethicone Suspension 30 milliLiter(s) Oral every 4 hours PRN Dyspepsia  melatonin 3 milliGRAM(s) Oral at bedtime PRN Insomnia  ondansetron Injectable 4 milliGRAM(s) IV Push every 8 hours PRN Nausea and/or Vomiting        REVIEW OF SYSTEMS  General:  No weight loss, fevers, or chills.  Eyes:  No reported pain or visual changes  ENT:  No sore throat or runny nose.  NECK: No stiffness or lymphadenopathy  CV:  No chest pain or palpitations.  Resp:  No shortness of breath, cough, wheezing or hemoptysis  GI:  No abdominal pain, nausea, vomiting, dysphagia, diarrhea or constipation. No rectal bleeding, melena, or hematemesis.  Muscle:  No aches or weakness  Neuro:  No tingling, numbness       VITALS:   T(F): 97.8 (10-21-24 @ 04:40), Max: 98.9 (10-20-24 @ 15:02)  HR: 58 (10-21-24 @ 04:40) (54 - 67)  BP: 116/73 (10-21-24 @ 04:40) (88/52 - 122/71)  RR: 18 (10-21-24 @ 04:40) (16 - 18)  SpO2: 97% (10-21-24 @ 04:40) (96% - 98%)    PHYSICAL EXAM:  GENERAL: AAOx3, no acute distress.  HEAD:  Atraumatic, Normocephalic  EYES: conjunctiva and sclera clear  NECK: Supple, No thyromegaly   CHEST/LUNG: Clear to auscultation bilaterally; No wheeze, rhonchi, or rales  HEART: Regular rate and rhythm; normal S1, S2, No murmurs.  ABDOMEN: Soft, nontender, nondistended; Bowel sounds present  NEUROLOGY: No asterixis or tremor  SKIN: Intact, no jaundice  Rectal exam-melenic stool in rectal vault        LABS:  10-21    143  |  106  |  16  ----------------------------<  96  3.8   |  27  |  0.7    Ca    7.9[L]      21 Oct 2024 06:00    TPro  4.8[L]  /  Alb  3.2[L]  /  TBili  0.6  /  DBili  x   /  AST  14  /  ALT  21  /  AlkPhos  47  10-21                          11.3   9.86  )-----------( 189      ( 21 Oct 2024 06:00 )             33.9     LIVER FUNCTIONS - ( 21 Oct 2024 06:00 )  Alb: 3.2 g/dL / Pro: 4.8 g/dL / ALK PHOS: 47 U/L / ALT: 21 U/L / AST: 14 U/L / GGT: x           PT/INR - ( 20 Oct 2024 12:45 )   PT: 12.00 sec;   INR: 1.05 ratio         PTT - ( 20 Oct 2024 12:45 )  PTT:23.4 sec    IMAGING:

## 2024-10-21 NOTE — CONSULT NOTE ADULT - ASSESSMENT
74yFemale pmh HTN, HLD presents for sudden onset bloody stools. Patient reports dark tarry stools that started yesterday. Patient denies N-saids, weight loss reports normal Colonoscopy 8 years ago. pAtient has never had an EGD before.      #dark tarry stools  #weakness   #hypotension  Rec  -will try for EGD today  -The benefits of endoscopy as well as the risks, such as GI bleeding, aspiration pneumonia, perforation, damage or loss of teeth, reaction to medication, or death  were reviewed with the patient.   -Maintain Hemodynamic Stability   -Monitor CBC  -CMP,Optimize Electrolytes  -PT,PTT,INR  -EKG, Chest-Xray   -Transfuse prn to hgb >8  -Two large bore IV lines  -Continue PPI BID  -Monitor Vital Signs  -Keep patient NPO  -Monitor Stool For blood, frequency, consistency, melena  -Active Type and Screen  -Iron Studies, Folate, Vitamin B12 levels  74yFemale pmh HTN, HLD presents for sudden onset bloody stools. Patient reports dark tarry stools that started yesterday. Patient denies N-saids, weight loss reports normal Colonoscopy 8 years ago. pAtient has never had an EGD before.      #dark tarry stools  #weakness   #hypotension  checked up on patient again patient with small episode of melena  Rec  -will try for EGD today  -The benefits of endoscopy as well as the risks, such as GI bleeding, aspiration pneumonia, perforation, damage or loss of teeth, reaction to medication, or death  were reviewed with the patient.   -Maintain Hemodynamic Stability   -Monitor CBC  -CMP,Optimize Electrolytes  -PT,PTT,INR  -EKG, Chest-Xray   -Transfuse prn to hgb >8  -Two large bore IV lines  -Continue PPI BID  -Monitor Vital Signs  -Keep patient NPO  -Monitor Stool For blood, frequency, consistency, melena  -Active Type and Screen  -Iron Studies, Folate, Vitamin B12 levels  74yFemale pmh HTN, HLD presents for sudden onset bloody stools. Patient reports dark tarry stools that started yesterday. Patient denies N-saids, weight loss reports normal Colonoscopy 8 years ago. pAtient has never had an EGD before.      #dark tarry stools  #weakness   #had hypotension now hemodynamically stable   checked up on patient again patient with small episode of melena  Rec  -unable to do EGD today, hgb stable updated patient   -EGD tomorrow   -The benefits of endoscopy as well as the risks, such as GI bleeding, aspiration pneumonia, perforation, damage or loss of teeth, reaction to medication, or death  were reviewed with the patient.   -Maintain Hemodynamic Stability   -Monitor CBC  -CMP,Optimize Electrolytes  -PT,PTT,INR  -EKG, Chest-Xray   -Transfuse prn to hgb >8  -Two large bore IV lines  -Continue PPI BID  -Monitor Vital Signs  -Keep patient NPO  -Monitor Stool For blood, frequency, consistency, melena  -Active Type and Screen  -Iron Studies, Folate, Vitamin B12 levels

## 2024-10-22 VITALS
RESPIRATION RATE: 18 BRPM | DIASTOLIC BLOOD PRESSURE: 59 MMHG | TEMPERATURE: 97 F | HEART RATE: 74 BPM | OXYGEN SATURATION: 96 % | SYSTOLIC BLOOD PRESSURE: 129 MMHG

## 2024-10-22 LAB
ALBUMIN SERPL ELPH-MCNC: 3.5 G/DL — SIGNIFICANT CHANGE UP (ref 3.5–5.2)
ALP SERPL-CCNC: 52 U/L — SIGNIFICANT CHANGE UP (ref 30–115)
ALT FLD-CCNC: 20 U/L — SIGNIFICANT CHANGE UP (ref 0–41)
ANION GAP SERPL CALC-SCNC: 10 MMOL/L — SIGNIFICANT CHANGE UP (ref 7–14)
AST SERPL-CCNC: 16 U/L — SIGNIFICANT CHANGE UP (ref 0–41)
BASOPHILS # BLD AUTO: 0.04 K/UL — SIGNIFICANT CHANGE UP (ref 0–0.2)
BASOPHILS NFR BLD AUTO: 0.5 % — SIGNIFICANT CHANGE UP (ref 0–1)
BILIRUB SERPL-MCNC: 1 MG/DL — SIGNIFICANT CHANGE UP (ref 0.2–1.2)
BUN SERPL-MCNC: 13 MG/DL — SIGNIFICANT CHANGE UP (ref 10–20)
CALCIUM SERPL-MCNC: 8.2 MG/DL — LOW (ref 8.4–10.5)
CHLORIDE SERPL-SCNC: 102 MMOL/L — SIGNIFICANT CHANGE UP (ref 98–110)
CO2 SERPL-SCNC: 29 MMOL/L — SIGNIFICANT CHANGE UP (ref 17–32)
CREAT SERPL-MCNC: 0.8 MG/DL — SIGNIFICANT CHANGE UP (ref 0.7–1.5)
EGFR: 76 ML/MIN/1.73M2 — SIGNIFICANT CHANGE UP
EOSINOPHIL # BLD AUTO: 0.18 K/UL — SIGNIFICANT CHANGE UP (ref 0–0.7)
EOSINOPHIL NFR BLD AUTO: 2.3 % — SIGNIFICANT CHANGE UP (ref 0–8)
GLUCOSE SERPL-MCNC: 77 MG/DL — SIGNIFICANT CHANGE UP (ref 70–99)
HCT VFR BLD CALC: 35.5 % — LOW (ref 37–47)
HGB BLD-MCNC: 11.7 G/DL — LOW (ref 12–16)
IMM GRANULOCYTES NFR BLD AUTO: 0.8 % — HIGH (ref 0.1–0.3)
LYMPHOCYTES # BLD AUTO: 1.54 K/UL — SIGNIFICANT CHANGE UP (ref 1.2–3.4)
LYMPHOCYTES # BLD AUTO: 19.3 % — LOW (ref 20.5–51.1)
MAGNESIUM SERPL-MCNC: 2.1 MG/DL — SIGNIFICANT CHANGE UP (ref 1.8–2.4)
MCHC RBC-ENTMCNC: 29.8 PG — SIGNIFICANT CHANGE UP (ref 27–31)
MCHC RBC-ENTMCNC: 33 G/DL — SIGNIFICANT CHANGE UP (ref 32–37)
MCV RBC AUTO: 90.3 FL — SIGNIFICANT CHANGE UP (ref 81–99)
MONOCYTES # BLD AUTO: 0.61 K/UL — HIGH (ref 0.1–0.6)
MONOCYTES NFR BLD AUTO: 7.6 % — SIGNIFICANT CHANGE UP (ref 1.7–9.3)
NEUTROPHILS # BLD AUTO: 5.55 K/UL — SIGNIFICANT CHANGE UP (ref 1.4–6.5)
NEUTROPHILS NFR BLD AUTO: 69.5 % — SIGNIFICANT CHANGE UP (ref 42.2–75.2)
NRBC # BLD: 0 /100 WBCS — SIGNIFICANT CHANGE UP (ref 0–0)
PLATELET # BLD AUTO: 206 K/UL — SIGNIFICANT CHANGE UP (ref 130–400)
PMV BLD: 9.4 FL — SIGNIFICANT CHANGE UP (ref 7.4–10.4)
POTASSIUM SERPL-MCNC: 4.1 MMOL/L — SIGNIFICANT CHANGE UP (ref 3.5–5)
POTASSIUM SERPL-SCNC: 4.1 MMOL/L — SIGNIFICANT CHANGE UP (ref 3.5–5)
PROT SERPL-MCNC: 5.3 G/DL — LOW (ref 6–8)
RBC # BLD: 3.93 M/UL — LOW (ref 4.2–5.4)
RBC # FLD: 14.5 % — SIGNIFICANT CHANGE UP (ref 11.5–14.5)
SODIUM SERPL-SCNC: 141 MMOL/L — SIGNIFICANT CHANGE UP (ref 135–146)
WBC # BLD: 7.98 K/UL — SIGNIFICANT CHANGE UP (ref 4.8–10.8)
WBC # FLD AUTO: 7.98 K/UL — SIGNIFICANT CHANGE UP (ref 4.8–10.8)

## 2024-10-22 PROCEDURE — 88312 SPECIAL STAINS GROUP 1: CPT | Mod: 26

## 2024-10-22 PROCEDURE — 99239 HOSP IP/OBS DSCHRG MGMT >30: CPT

## 2024-10-22 PROCEDURE — 43239 EGD BIOPSY SINGLE/MULTIPLE: CPT

## 2024-10-22 PROCEDURE — 88305 TISSUE EXAM BY PATHOLOGIST: CPT | Mod: 26

## 2024-10-22 RX ORDER — PANTOPRAZOLE SODIUM 40 MG/1
1 TABLET, DELAYED RELEASE ORAL
Qty: 60 | Refills: 0
Start: 2024-10-22 | End: 2024-11-20

## 2024-10-22 RX ADMIN — PANTOPRAZOLE SODIUM 10 MG/HR: 40 TABLET, DELAYED RELEASE ORAL at 07:44

## 2024-10-22 RX ADMIN — Medication 5 MILLIGRAM(S): at 05:34

## 2024-10-22 NOTE — DISCHARGE NOTE NURSING/CASE MANAGEMENT/SOCIAL WORK - FINANCIAL ASSISTANCE
[facility] provides services at a reduced cost to those who are determined to be eligible through [facility]’s financial assistance program. Information regarding [facility]’s financial assistance program can be found by going to [email or web address] or by calling [phone].

## 2024-10-22 NOTE — DISCHARGE NOTE PROVIDER - NSDCCPCAREPLAN_GEN_ALL_CORE_FT
PRINCIPAL DISCHARGE DIAGNOSIS  Diagnosis: GIB (gastrointestinal bleeding)  Assessment and Plan of Treatment: Gastrointestinal (GI) bleeding is a symptom of a disorder in your digestive tract. The blood often appears in stool or vomit but isn't always visible, though it may cause the stool to look black or tarry. The level of bleeding can range from mild to severe and can be life-threatening.  Sophisticated imaging technology, when needed, can usually locate the cause of the bleeding. Treatment depends on the source of the bleeding.  A gastrointestinal bleed can cause: Shock, Anemia, or even Death  To help prevent a GI bleed: Limit your use of nonsteroidal anti-inflammatory drugs, Limit your use of alcohol. If you smoke, quit.  Often, GI bleeding stops on its own. If it doesn't, treatment depends on where the bleed is from. In many cases, medication or a procedure to control the bleeding can be given during some tests. For example, it's sometimes possible to treat a bleeding peptic ulcer during an upper endoscopy or to remove polyps during a colonoscopy.  If you have an upper GI bleed, you might be given an IV drug known as a proton pump inhibitor (PPI) to suppress stomach acid production. Once the source of the bleeding is identified, your doctor will determine whether you need to continue taking a PPI.  Depending on the amount of blood loss and whether you continue to bleed, you might require fluids through a needle (IV) and, possibly, blood transfusions. If you take blood-thinning medications, including aspirin or nonsteroidal anti-inflammatory medications, you might need to stop.   If you have symptoms of shock, you or someone else should call 911 or your local emergency medical number. If you're vomiting blood, see blood in your stools or have black, tarry stools, seek immediate medical care. For other indications of GI bleeding, make an appointment with your doctor.

## 2024-10-22 NOTE — DISCHARGE NOTE NURSING/CASE MANAGEMENT/SOCIAL WORK - PATIENT PORTAL LINK FT
You can access the FollowMyHealth Patient Portal offered by [facility] by registering at the following website: [email or web address]. By joining [employer]’s FollowMyHealth portal, you will also be able to view your health information using other applications (apps) compatible with our system.
Home

## 2024-10-22 NOTE — DISCHARGE NOTE PROVIDER - ATTENDING DISCHARGE PHYSICAL EXAMINATION:
GENERAL: alert, comfortable, frail, NAD  CHEST/LUNG: Clear to auscultation bilaterally  HEART: Regular rate and rhythm; s1,s2  ABDOMEN: Soft, Nontender, Nondistended; Bowel sounds present  NEUROLOGY: non-focal  SKIN: No rashes or lesions

## 2024-10-22 NOTE — CHART NOTE - NSCHARTNOTEFT_GEN_A_CORE
patient with esophageal ulcers on EGD  -PPI BID  -diet as tolerated  - Follow up with our [facility] located at [address] [place] [address]. Phone Number: [phone]

## 2024-10-22 NOTE — DISCHARGE NOTE PROVIDER - NSDCMRMEDTOKEN_GEN_ALL_CORE_FT
amLODIPine 5 mg oral tablet: 1 tab(s) orally once a day  atenolol 50 mg oral tablet: 1 tab(s) orally once a day  calcium (as carbonate)-vitamin D 600 mg-5 mcg (200 intl units) oral tablet: 1 tab(s) orally once a day  ergocalciferol 1.25 mg (50,000 intl units) oral tablet: orally once a day  halobetasol 0.05% topical cream: Apply topically to affected area once a day as needed for  itching  Lipitor 20 mg oral tablet: 1 tab(s) orally once a day  losartan 100 mg oral tablet: 1 tab(s) orally once a day  Mintox Plus 200 mg-200 mg-25 mg oral tablet, chewable: 1 tab(s) chewed once a day  vitamin b complex:

## 2024-10-22 NOTE — PROGRESS NOTE ADULT - SUBJECTIVE AND OBJECTIVE BOX
CLARE PORTER 74y Female  MRN#: [id number]     SUBJECTIVE  Patient is a 74y old Female who presents with a chief complaint of GI Bleed (21 Oct 2024 11:16)    Interval/Overnight Events:    Today is hospital day 2d, and this morning she is lying in bed without distress.   No acute overnight events.     OBJECTIVE  PAST MEDICAL & SURGICAL HISTORY  HTN (hypertension)    HLD (hyperlipidemia)      ALLERGIES:  No Known Allergies    MEDICATIONS:  STANDING MEDICATIONS  amLODIPine   Tablet 5 milliGRAM(s) Oral daily  atorvastatin 20 milliGRAM(s) Oral at bedtime  calcium carbonate 1250 mG  + Vitamin D (OsCal 500 + D) 1 Tablet(s) Oral daily  chlorhexidine 2% Cloths 1 Application(s) Topical daily  pantoprazole Infusion 8 mG/Hr IV Continuous <Continuous>    PRN MEDICATIONS  acetaminophen     Tablet .. 650 milliGRAM(s) Oral every 6 hours PRN  aluminum hydroxide/magnesium hydroxide/simethicone Suspension 30 milliLiter(s) Oral every 4 hours PRN  melatonin 3 milliGRAM(s) Oral at bedtime PRN  ondansetron Injectable 4 milliGRAM(s) IV Push every 8 hours PRN    HOME MEDICATIONS  Home Medications:  amLODIPine 5 mg oral tablet: 1 tab(s) orally once a day (20 Oct 2024 15:42)  atenolol 50 mg oral tablet: 1 tab(s) orally once a day (20 Oct 2024 15:42)  calcium (as carbonate)-vitamin D 600 mg-5 mcg (200 intl units) oral tablet: 1 tab(s) orally once a day (20 Oct 2024 15:42)  ergocalciferol 1.25 mg (50,000 intl units) oral tablet: orally once a day (20 Oct 2024 15:42)  halobetasol 0.05% topical cream: Apply topically to affected area once a day as needed for  itching (20 Oct 2024 15:42)  Lipitor 20 mg oral tablet: 1 tab(s) orally once a day (20 Oct 2024 15:42)  losartan 100 mg oral tablet: 1 tab(s) orally once a day (20 Oct 2024 15:42)  Mintox Plus 200 mg-200 mg-25 mg oral tablet, chewable: 1 tab(s) chewed once a day (20 Oct 2024 15:42)  vitamin b complex:  (20 Oct 2024 15:42)      LABS:                        11.7   7.98  )-----------( 206      ( 22 Oct 2024 05:34 )             35.5     10-22    141  |  102  |  13  ----------------------------<  77  4.1   |  29  |  0.8    Ca    8.2[L]      22 Oct 2024 05:34  Mg     2.1     10-22    TPro  5.3[L]  /  Alb  3.5  /  TBili  1.0  /  DBili  x   /  AST  16  /  ALT  20  /  AlkPhos  52  10-22    LIVER FUNCTIONS - ( 22 Oct 2024 05:34 )  Alb: 3.5 g/dL / Pro: 5.3 g/dL / ALK PHOS: 52 U/L / ALT: 20 U/L / AST: 16 U/L / GGT: x             Urinalysis Basic - ( 22 Oct 2024 05:34 )    Color: x / Appearance: x / SG: x / pH: x  Gluc: 77 mg/dL / Ketone: x  / Bili: x / Urobili: x   Blood: x / Protein: x / Nitrite: x   Leuk Esterase: x / RBC: x / WBC x   Sq Epi: x / Non Sq Epi: x / Bacteria: x            Urinalysis with Rflx Culture (collected 20 Oct 2024 13:52)          CAPILLARY BLOOD GLUCOSE          PHYSICAL EXAM:  T(C): 36.6 (10-22-24 @ 12:34), Max: 36.9 (10-21-24 @ 16:43)  HR: 69 (10-22-24 @ 12:34) (60 - 70)  BP: 117/75 (10-22-24 @ 12:34) (111/68 - 127/81)  RR: 18 (10-22-24 @ 12:34) (18 - 18)  SpO2: 95% (10-22-24 @ 12:34) (95% - 97%)    GENERAL: alert, comfortable, frail, NAD  CHEST/LUNG: Clear to auscultation bilaterally  HEART: Regular rate and rhythm; s1,s2  ABDOMEN: Soft, Nontender, Nondistended; Bowel sounds present  NEUROLOGY: non-focal  SKIN: No rashes or lesions      ADMISSION SUMMARY  Patient is a 74y old Female who presents with a chief complaint of GI Bleed (21 Oct 2024 11:16)

## 2024-10-22 NOTE — PROGRESS NOTE ADULT - ASSESSMENT
74y old female with pmhx of HTN, HLD presents to the ED as per family complaining of sudden onset of bloody stools noticed while having BM yesterday. pt states reoccurence of dark blood mixed with dark tarry stools today. Admitted to telemetry for GI bleed.    #Suspected UGIB  - + IRMA tarry stool mixed with dark red blood  - Hgb 9 on admission  - s/p 1 unit pRBC  - GI consulted  - NPO  - sp EGD 10/22/24: esophageal ulcers  - PPI BID  - Keep active T&S  - Trend H/H  - transfuse prn    #ELIZABETH  - likely pre-renal in setting of GI bleed and volume loss  - s/p IV hydration  - monitor BMP  - avoid nephrotoxic meds    #HTN  - on losartan and atenolol at home  - hold antihypertensives in setting of GI bleed  - monitor BP    #HLD  - c/w atorvastatin    Misc:  DVT ppx: SCDs  GI ppx: Protonix  Diet: NPO  Activity: IAT  Code: Full Code  Dispo: dc planning after EGD   74y old female with pmhx of HTN, HLD presents to the ED as per family complaining of sudden onset of bloody stools noticed while having BM yesterday. pt states reoccurence of dark blood mixed with dark tarry stools today. Admitted to telemetry for GI bleed.    #Suspected UGIB  - + IRMA tarry stool mixed with dark red blood  - Hgb 9 on admission  - s/p 1 unit pRBC  - GI consulted  - NPO  - sp EGD 10/22/24: esophageal ulcers  - PPI BID  - Keep active T&S  - Trend H/H  - transfuse prn    #ELIZABETH  - likely pre-renal in setting of GI bleed and volume loss  - s/p IV hydration  - monitor BMP  - avoid nephrotoxic meds    #HTN  - on losartan and atenolol at home  - hold antihypertensives in setting of GI bleed  - monitor BP    #HLD  - c/w atorvastatin    Misc:  DVT ppx: SCDs  GI ppx: Protonix  Diet: Clears  Activity: IAT  Code: Full Code  Dispo: dc planning     Advance diet, likely dc in AM

## 2024-10-22 NOTE — DISCHARGE NOTE PROVIDER - HOSPITAL COURSE
HPI:  74y old female with pmhx of HTN, HLD presents to the ED as per family complaining of sudden onset of bloody stools noticed while having BM yesterday. pt states reoccurence of dark blood mixed with dark tarry stools today. pt was seen by PMD and told to see GI. As per family pt became weak and lightheaded today but denies chest/abdominal pain, SOB, headache. n/v, fever/chills, LUTS or LOC. Colonoscopy about 8yrs ago-[facility]    Hospital course:  Pt admitted for suspected UGIB. s/p EGD 10/22/24 noted to have esophageal ulcers. PPI BID. diet advanced. Pt tolerated PO. Medically stable for discharge.     #Suspected UGIB  - + IRMA tarry stool mixed with dark red blood  - Hgb 9 on admission  - s/p 1 unit pRBC  - GI consulted  - NPO  - sp EGD 10/22/24: esophageal ulcers  - PPI BID  - Keep active T&S  - Trend H/H  - transfuse prn    #ELIZABETH  - likely pre-renal in setting of GI bleed and volume loss  - s/p IV hydration  - monitor BMP  - avoid nephrotoxic meds    #HTN  - on losartan and atenolol at home  - hold antihypertensives in setting of GI bleed  - monitor BP    #HLD  - c/w atorvastatin

## 2024-10-22 NOTE — DISCHARGE NOTE PROVIDER - CARE PROVIDER_API CALL
Renetta Vinson  Gastroenterology  [address] [facility] [address] [facility] [place] [address]  Phone: [phone]  Fax: [phone]  Follow Up Time: 1 month

## 2024-10-29 DIAGNOSIS — K29.70 GASTRITIS, UNSPECIFIED, WITHOUT BLEEDING: ICD-10-CM

## 2024-10-29 DIAGNOSIS — K44.9 DIAPHRAGMATIC HERNIA WITHOUT OBSTRUCTION OR GANGRENE: ICD-10-CM

## 2024-10-29 DIAGNOSIS — K57.10 DIVERTICULOSIS OF SMALL INTESTINE WITHOUT PERFORATION OR ABSCESS WITHOUT BLEEDING: ICD-10-CM

## 2024-10-29 DIAGNOSIS — I10 ESSENTIAL (PRIMARY) HYPERTENSION: ICD-10-CM

## 2024-10-29 DIAGNOSIS — E78.5 HYPERLIPIDEMIA, UNSPECIFIED: ICD-10-CM

## 2024-10-29 DIAGNOSIS — I95.9 HYPOTENSION, UNSPECIFIED: ICD-10-CM

## 2024-10-29 DIAGNOSIS — K25.4 CHRONIC OR UNSPECIFIED GASTRIC ULCER WITH HEMORRHAGE: ICD-10-CM

## 2024-10-29 DIAGNOSIS — R73.9 HYPERGLYCEMIA, UNSPECIFIED: ICD-10-CM

## 2024-11-06 RX ORDER — LOSARTAN POTASSIUM 25 MG/1
1 TABLET ORAL
Refills: 0 | DISCHARGE

## 2024-11-06 RX ORDER — ALUMINA, MAGNESIA, AND SIMETHICONE 2400; 2400; 240 MG/30ML; MG/30ML; MG/30ML
1 SUSPENSION ORAL
Refills: 0 | DISCHARGE

## 2024-11-06 RX ORDER — AMLODIPINE BESYLATE 10 MG
1 TABLET ORAL
Refills: 0 | DISCHARGE

## 2024-11-06 RX ORDER — ERGOCALCIFEROL (VITAMIN D2) 200 MCG/ML
0 DROPS ORAL
Refills: 0 | DISCHARGE

## 2024-11-06 RX ORDER — DIETHYLTOLUAMIDE 7 %
1 SPRAY, NON-AEROSOL (ML) TOPICAL
Refills: 0 | DISCHARGE

## 2024-11-06 RX ORDER — ATENOLOL 100 MG
1 TABLET ORAL
Refills: 0 | DISCHARGE

## 2024-11-06 RX ORDER — HALOBETASOL PROPIONATE 0.5 MG/G
1 OINTMENT TOPICAL
Refills: 0 | DISCHARGE